# Patient Record
Sex: MALE | Race: WHITE | NOT HISPANIC OR LATINO | URBAN - METROPOLITAN AREA
[De-identification: names, ages, dates, MRNs, and addresses within clinical notes are randomized per-mention and may not be internally consistent; named-entity substitution may affect disease eponyms.]

---

## 2022-11-14 ENCOUNTER — TELEPHONE (OUTPATIENT)
Dept: PSYCHIATRY | Facility: CLINIC | Age: 62
End: 2022-11-14

## 2022-11-17 ENCOUNTER — TELEMEDICINE (OUTPATIENT)
Dept: PSYCHIATRY | Facility: CLINIC | Age: 62
End: 2022-11-17

## 2022-11-17 DIAGNOSIS — F41.1 GENERALIZED ANXIETY DISORDER: ICD-10-CM

## 2022-11-17 DIAGNOSIS — F31.9 BIPOLAR 1 DISORDER (HCC): Primary | ICD-10-CM

## 2022-11-17 RX ORDER — DIVALPROEX SODIUM 500 MG/1
500 TABLET, EXTENDED RELEASE ORAL
Qty: 180 TABLET | Refills: 0 | Status: SHIPPED | OUTPATIENT
Start: 2022-11-17

## 2022-11-17 RX ORDER — OLANZAPINE 5 MG/1
5 TABLET ORAL
COMMUNITY
Start: 2022-10-21 | End: 2022-11-17 | Stop reason: SDUPTHER

## 2022-11-17 RX ORDER — OLANZAPINE 15 MG/1
15 TABLET ORAL
Qty: 90 TABLET | Refills: 0 | Status: SHIPPED | OUTPATIENT
Start: 2022-11-17

## 2022-11-17 RX ORDER — LORAZEPAM 1 MG/1
1 TABLET ORAL 2 TIMES DAILY PRN
COMMUNITY
Start: 2022-08-30 | End: 2022-11-17 | Stop reason: SDUPTHER

## 2022-11-17 RX ORDER — OLANZAPINE 5 MG/1
5 TABLET ORAL
Qty: 90 TABLET | Refills: 0 | Status: SHIPPED | OUTPATIENT
Start: 2022-11-17

## 2022-11-17 RX ORDER — DIVALPROEX SODIUM 500 MG/1
TABLET, EXTENDED RELEASE ORAL
COMMUNITY
Start: 2022-11-15 | End: 2022-11-17 | Stop reason: SDUPTHER

## 2022-11-17 RX ORDER — OLANZAPINE 15 MG/1
15 TABLET ORAL
COMMUNITY
Start: 2022-10-08 | End: 2022-11-17 | Stop reason: SDUPTHER

## 2022-11-17 RX ORDER — LORAZEPAM 1 MG/1
1 TABLET ORAL 2 TIMES DAILY PRN
Qty: 60 TABLET | Refills: 1 | Status: SHIPPED | OUTPATIENT
Start: 2022-11-17

## 2022-11-17 NOTE — PSYCH
Virtual Regular Visit    Verification of patient location:    Patient is located in the following state in which I hold an active license [place]      Assessment/Plan:    Problem List Items Addressed This Visit    None  Visit Diagnoses     Bipolar 1 disorder ([facility] [address] )    -  Primary    Relevant Medications    OLANZapine (ZyPREXA) 5 mg tablet    OLANZapine (ZyPREXA) 15 mg tablet    LORazepam (ATIVAN) 1 mg tablet    divalproex sodium (DEPAKOTE ER) 500 mg 24 hr tablet    Other Relevant Orders    CBC and differential    Hepatic function panel    Valproic acid level, total    Basic metabolic panel    Generalized anxiety disorder        Relevant Medications    OLANZapine (ZyPREXA) 5 mg tablet    OLANZapine (ZyPREXA) 15 mg tablet    LORazepam (ATIVAN) 1 mg tablet          Goals addressed in session: Goal 1          Reason for visit is   Chief Complaint   Patient presents with   • Medication Management        Encounter provider Karlee Starks MD    Provider located at [address] [facility]  [address]  [phone]      Recent Visits  Date Type Provider Dept   11/14/22 Telephone 1140 [facility] recent visits within past 7 days and meeting all other requirements  Today's Visits  Date Type Provider Dept   11/17/22 [facility]icine [facility]  [address] [facility] and meeting all other requirements  Future Appointments  No visits were found meeting these conditions  Showing future appointments within next 150 days and meeting all other requirements       The patient was identified by name and date of birth  Aleksey Davis was informed that this is a telemedicine visit and that the visit is being conducted [employer]  He agrees to proceed     My office door was closed  No one else was in the room    He acknowledged consent and understanding of privacy and security of the video platform  The patient has agreed to participate and understands they can discontinue the visit at any time  Patient is aware this is a billable service  Subjective  Deric Loza is a 63 y o  male with bipolar do and anxiety presents for f/u    Collateral obtained from wife, who was present during the interview  Pt is compliant with meds; off ativan  SE - increased appetite and weight gain  Pt reports feeling good and stable for the last 3 months  I  received and reviewed blood work - done in 05/22 - depakote 72; LFTs, CBC - WNL; elevated cholesterol      HPI   Mood - back to baseline; denies depression or emerson; stable mood, functions in his daily routine  Anxiety - denies; off ativan for 2-3 months    Past Medical History:   Diagnosis Date   • History of hypertension    • Hypercholesterolemia        No past surgical history on file  Current Outpatient Medications   Medication Sig Dispense Refill   • divalproex sodium (DEPAKOTE ER) 500 mg 24 hr tablet Take 1 tablet (500 mg total) by mouth daily at bedtime 180 tablet 0   • LORazepam (ATIVAN) 1 mg tablet Take 1 tablet (1 mg total) by mouth 2 (two) times a day as needed for anxiety 60 tablet 1   • OLANZapine (ZyPREXA) 15 mg tablet Take 1 tablet (15 mg total) by mouth daily at bedtime 90 tablet 0   • OLANZapine (ZyPREXA) 5 mg tablet Take 1 tablet (5 mg total) by mouth daily at bedtime 90 tablet 0     No current facility-administered medications for this visit  No Known Allergies    Review of Systems   Constitutional: Positive for appetite change (increased)  Negative for activity change  Psychiatric/Behavioral: Negative for sleep disturbance and suicidal ideas  Video Exam    There were no vitals filed for this visit  Physical Exam  Constitutional:       Appearance: Normal appearance  Neurological:      Mental Status: He is alert     Psychiatric:         Attention and Perception: Attention and perception normal  Mood and Affect: Mood and affect normal          Speech: Speech normal          Behavior: Behavior normal  Behavior is cooperative  Thought Content:  Thought content normal          Judgment: Judgment normal           Visit Time    Visit Start Time: 6 17 pm   Visit Stop Time: 6 31 pm   Total Visit Duration: 20 minutes

## 2023-02-02 ENCOUNTER — TELEMEDICINE (OUTPATIENT)
Dept: PSYCHIATRY | Facility: CLINIC | Age: 63
End: 2023-02-02

## 2023-02-02 DIAGNOSIS — F41.1 GENERALIZED ANXIETY DISORDER: ICD-10-CM

## 2023-02-02 DIAGNOSIS — F31.9 BIPOLAR 1 DISORDER (HCC): Primary | ICD-10-CM

## 2023-02-02 RX ORDER — DIVALPROEX SODIUM 500 MG/1
500 TABLET, EXTENDED RELEASE ORAL
Qty: 180 TABLET | Refills: 0 | Status: SHIPPED | OUTPATIENT
Start: 2023-02-02

## 2023-02-02 RX ORDER — OLANZAPINE 5 MG/1
5 TABLET ORAL
Qty: 90 TABLET | Refills: 0 | Status: SHIPPED | OUTPATIENT
Start: 2023-02-02

## 2023-02-02 RX ORDER — OLANZAPINE 15 MG/1
15 TABLET ORAL
Qty: 90 TABLET | Refills: 0 | Status: SHIPPED | OUTPATIENT
Start: 2023-02-02

## 2023-02-02 NOTE — PSYCH
Virtual Regular Visit    Verification of patient location:    Patient is located in the following state in which I hold an active license [place]      Assessment/Plan:    Problem List Items Addressed This Visit    None  Visit Diagnoses     Bipolar 1 disorder ([facility] [address] )    -  Primary    Relevant Medications    OLANZapine (ZyPREXA) 5 mg tablet    OLANZapine (ZyPREXA) 15 mg tablet    divalproex sodium (DEPAKOTE ER) 500 mg 24 hr tablet    Other Relevant Orders    CBC and differential    Hepatic function panel    Valproic acid level, total    Generalized anxiety disorder        Relevant Medications    OLANZapine (ZyPREXA) 5 mg tablet    OLANZapine (ZyPREXA) 15 mg tablet          Goals addressed in session: Goal 1          Reason for visit is   Chief Complaint   Patient presents with   • Medication Management        Encounter provider Piter Maddox MD    Provider located at [address] [facility]  [address]  [phone]      Recent Visits  No visits were found meeting these conditions  Showing recent visits within past 7 days and meeting all other requirements  Today's Visits  Date Type Provider Dept   02/02/23 Telemedicine Piter Maddox, [address] [place] [address] today's visits and meeting all other requirements  Future Appointments  No visits were found meeting these conditions  Showing future appointments within next 150 days and meeting all other requirements       The patient was identified by name and date of birth  Jordynino Chhaya was informed that this is a telemedicine visit and that the visit is being conducted [employer]  He agrees to proceed     My office door was closed  No one else was in the room  He acknowledged consent and understanding of privacy and security of the video platform  The patient has agreed to participate and understands they can discontinue the visit at any time      Patient is aware this is a billable service  Subjective  Nadeen Ramsey is a 63 y o  male with bipolar do and anxiety presents for regular f/u    Compliant with meds, denies SE  Does `t use ativan   Foot pain - PT - improving  Check up with PCP next month  Weight stable  Collateral obtained form wife - she was present during the interview      HPI   Mood - reports as good and stable; denies blood work or depression  Good energy, functions at baseline  Anxiety - controlled --doesn`t need ativan    Past Medical History:   Diagnosis Date   • History of hypertension    • Hypercholesterolemia        History reviewed  No pertinent surgical history  Current Outpatient Medications   Medication Sig Dispense Refill   • divalproex sodium (DEPAKOTE ER) 500 mg 24 hr tablet Take 1 tablet (500 mg total) by mouth daily at bedtime 180 tablet 0   • OLANZapine (ZyPREXA) 15 mg tablet Take 1 tablet (15 mg total) by mouth daily at bedtime 90 tablet 0   • OLANZapine (ZyPREXA) 5 mg tablet Take 1 tablet (5 mg total) by mouth daily at bedtime 90 tablet 0   • LORazepam (ATIVAN) 1 mg tablet Take 1 tablet (1 mg total) by mouth 2 (two) times a day as needed for anxiety 60 tablet 1     No current facility-administered medications for this visit  No Known Allergies    Review of Systems   Constitutional: Negative for activity change and appetite change  Psychiatric/Behavioral: Negative for dysphoric mood, sleep disturbance and suicidal ideas  The patient is not nervous/anxious  Video Exam    There were no vitals filed for this visit  Physical Exam  Constitutional:       Appearance: Normal appearance  He is normal weight  Neurological:      Mental Status: He is alert  Psychiatric:         Attention and Perception: Attention and perception normal          Mood and Affect: Mood normal          Speech: Speech normal          Behavior: Behavior normal  Behavior is cooperative  Thought Content:  Thought content normal  Judgment: Judgment normal           Visit Time    Visit Start Time: 6 16 pm   Visit Stop Time: 6 25 pm   Total Visit Duration: 16 minutes

## 2023-05-11 ENCOUNTER — TELEMEDICINE (OUTPATIENT)
Dept: PSYCHIATRY | Facility: CLINIC | Age: 63
End: 2023-05-11

## 2023-05-11 DIAGNOSIS — F41.1 GENERALIZED ANXIETY DISORDER: ICD-10-CM

## 2023-05-11 DIAGNOSIS — F31.9 BIPOLAR 1 DISORDER (HCC): Primary | ICD-10-CM

## 2023-05-11 RX ORDER — OLANZAPINE 15 MG/1
15 TABLET ORAL
Qty: 90 TABLET | Refills: 0 | Status: SHIPPED | OUTPATIENT
Start: 2023-05-11

## 2023-05-11 RX ORDER — DIVALPROEX SODIUM 500 MG/1
500 TABLET, EXTENDED RELEASE ORAL
Qty: 180 TABLET | Refills: 0 | Status: SHIPPED | OUTPATIENT
Start: 2023-05-11

## 2023-05-11 RX ORDER — OLANZAPINE 5 MG/1
5 TABLET ORAL
Qty: 90 TABLET | Refills: 0 | Status: SHIPPED | OUTPATIENT
Start: 2023-05-11

## 2023-05-11 NOTE — PATIENT INSTRUCTIONS
Continue zyprexa and depakote  Will consider taper down zyprexa to 15 mg next appt  No blood work for review

## 2023-05-11 NOTE — PSYCH
Virtual Regular Visit    Verification of patient location:    Patient is located at Home in the following state in which I hold an active license [place]      Assessment/Plan:    Problem List Items Addressed This Visit    None      Goals addressed in session: Goal 1          Reason for visit is   Chief Complaint   Patient presents with   • Medication Management        Encounter provider Eduar Forman MD    Provider located at [address] [facility]  [address]  [phone]      Recent Visits  No visits were found meeting these conditions  Showing recent visits within past 7 days and meeting all other requirements  Today's Visits  Date Type Provider Dept   05/11/23 Telemedicine Eduar Forman, [facility] today's visits and meeting all other requirements  Future Appointments  No visits were found meeting these conditions  Showing future appointments within next 150 days and meeting all other requirements       The patient was identified by name and date of birth  Bobo Jaimes was informed that this is a telemedicine visit and that the visit is being conducted [employer]  He agrees to proceed     My office door was closed  No one else was in the room  He acknowledged consent and understanding of privacy and security of the video platform  The patient has agreed to participate and understands they can discontinue the visit at any time  Patient is aware this is a billable service  Subjective  Bobo Jaimes is a 63 y o  male with bipolar do presents for regular f/u      Compliant with meds , SE increased appetite at night; weight stable  R foot tendinitis  Collateral obtained from wife, who was present during the interview  Invited to the wedding next month      HPI   Mood - reports as good and stable; denies depression; functions at baseline  Anxiety - controlled; denies panic attacks or racing thoughts     Past Medical History:   Diagnosis Date   • History of hypertension    • Hypercholesterolemia        History reviewed  No pertinent surgical history  Current Outpatient Medications   Medication Sig Dispense Refill   • divalproex sodium (DEPAKOTE ER) 500 mg 24 hr tablet Take 1 tablet (500 mg total) by mouth daily at bedtime 180 tablet 0   • LORazepam (ATIVAN) 1 mg tablet Take 1 tablet (1 mg total) by mouth 2 (two) times a day as needed for anxiety 60 tablet 1   • OLANZapine (ZyPREXA) 15 mg tablet Take 1 tablet (15 mg total) by mouth daily at bedtime 90 tablet 0   • OLANZapine (ZyPREXA) 5 mg tablet Take 1 tablet (5 mg total) by mouth daily at bedtime 90 tablet 0     No current facility-administered medications for this visit  No Known Allergies    Review of Systems   Constitutional: Negative for activity change and appetite change  Psychiatric/Behavioral: Negative for dysphoric mood, sleep disturbance and suicidal ideas  The patient is not nervous/anxious  Video Exam    There were no vitals filed for this visit  Physical Exam  Constitutional:       Appearance: Normal appearance  He is normal weight  Neurological:      Mental Status: He is alert  Psychiatric:         Attention and Perception: Attention and perception normal          Mood and Affect: Mood and affect normal          Speech: Speech normal          Behavior: Behavior normal  Behavior is cooperative  Thought Content:  Thought content normal          Judgment: Judgment normal           Visit Time    Visit Start Time: 6 19 pm   Visit Stop Time: 6 27 pm   Total Visit Duration: 17 minutes

## 2023-08-17 ENCOUNTER — TELEMEDICINE (OUTPATIENT)
Dept: PSYCHIATRY | Facility: CLINIC | Age: 63
End: 2023-08-17
Payer: MEDICARE

## 2023-08-17 DIAGNOSIS — F41.1 GENERALIZED ANXIETY DISORDER: ICD-10-CM

## 2023-08-17 DIAGNOSIS — F31.9 BIPOLAR 1 DISORDER (HCC): Primary | ICD-10-CM

## 2023-08-17 PROCEDURE — 99214 OFFICE O/P EST MOD 30 MIN: CPT | Performed by: PSYCHIATRY & NEUROLOGY

## 2023-08-17 RX ORDER — DIVALPROEX SODIUM 500 MG/1
500 TABLET, EXTENDED RELEASE ORAL
Qty: 90 TABLET | Refills: 0 | Status: SHIPPED | OUTPATIENT
Start: 2023-08-17

## 2023-08-17 RX ORDER — OLANZAPINE 15 MG/1
15 TABLET ORAL
Qty: 90 TABLET | Refills: 0 | Status: SHIPPED | OUTPATIENT
Start: 2023-08-17

## 2023-08-17 RX ORDER — OLANZAPINE 2.5 MG/1
2.5 TABLET ORAL
Qty: 90 TABLET | Refills: 0 | Status: SHIPPED | OUTPATIENT
Start: 2023-08-17

## 2023-08-17 NOTE — PSYCH
Virtual Regular Visit    Verification of patient location:    Patient is located at Home in the following state in which I hold an active license [place]      Assessment/Plan:    Problem List Items Addressed This Visit    None      Goals addressed in session: Goal 1          Reason for visit is   Chief Complaint   Patient presents with   • Medication Management        Encounter provider Moses Orona MD    Provider located at [address] [place] [address],[address]  [address]  [phone]      Recent Visits  No visits were found meeting these conditions. Showing recent visits within past 7 days and meeting all other requirements  Today's Visits  Date Type Provider Dept   08/17/23 Telemedicine Moses Orona, [address] today's visits and meeting all other requirements  Future Appointments  No visits were found meeting these conditions. Showing future appointments within next 150 days and meeting all other requirements       The patient was identified by name and date of birth. Ludwig Jacob was informed that this is a telemedicine visit and that the visit is being conducted [employer]. He agrees to proceed. .  My office door was closed. No one else was in the room. He acknowledged consent and understanding of privacy and security of the video platform. The patient has agreed to participate and understands they can discontinue the visit at any time. Patient is aware this is a billable service.      Subjective  Ludwig Jacob is a 63 y.o. male with bipolar do presents for regular f/u  .  compliant with meds, SE increased appetite  Collateral obtained from his wife, who was present during the interview  Foot pain - improving  Pt helps his mother daily      HPI   Mood - reports as good and stable; active, social; denies depression or emerson  Functions at baseline  Anxiety - controlled; denies panic attacks or racing thoughts; does`nt take ativan    Past Medical History:   Diagnosis Date   • History of hypertension    • Hypercholesterolemia        History reviewed. No pertinent surgical history. Current Outpatient Medications   Medication Sig Dispense Refill   • divalproex sodium (DEPAKOTE ER) 500 mg 24 hr tablet Take 1 tablet (500 mg total) by mouth daily at bedtime 180 tablet 0   • LORazepam (ATIVAN) 1 mg tablet Take 1 tablet (1 mg total) by mouth 2 (two) times a day as needed for anxiety 60 tablet 1   • OLANZapine (ZyPREXA) 15 mg tablet Take 1 tablet (15 mg total) by mouth daily at bedtime 90 tablet 0   • OLANZapine (ZyPREXA) 5 mg tablet Take 1 tablet (5 mg total) by mouth daily at bedtime 90 tablet 0     No current facility-administered medications for this visit. No Known Allergies    Review of Systems   Constitutional: Negative for activity change and appetite change. Psychiatric/Behavioral: Negative for dysphoric mood, sleep disturbance and suicidal ideas. The patient is not nervous/anxious. Video Exam    There were no vitals filed for this visit. Physical Exam  Constitutional:       Appearance: Normal appearance. He is normal weight. Neurological:      Mental Status: He is alert. Psychiatric:         Attention and Perception: Attention and perception normal.         Mood and Affect: Mood and affect normal.         Speech: Speech normal.         Behavior: Behavior normal. Behavior is cooperative. Thought Content:  Thought content normal.         Judgment: Judgment normal.          Visit Time    Visit Start Time: 6.19 pm   Visit Stop Time: 6.28 pm   Total Visit Duration: 17 minutes

## 2023-11-16 ENCOUNTER — TELEMEDICINE (OUTPATIENT)
Dept: PSYCHIATRY | Facility: CLINIC | Age: 63
End: 2023-11-16
Payer: MEDICARE

## 2023-11-16 DIAGNOSIS — F41.1 GENERALIZED ANXIETY DISORDER: Primary | ICD-10-CM

## 2023-11-16 DIAGNOSIS — F31.9 BIPOLAR 1 DISORDER (HCC): ICD-10-CM

## 2023-11-16 PROCEDURE — 99214 OFFICE O/P EST MOD 30 MIN: CPT | Performed by: PSYCHIATRY & NEUROLOGY

## 2023-11-16 RX ORDER — OLANZAPINE 15 MG/1
15 TABLET ORAL
Qty: 90 TABLET | Refills: 0 | Status: SHIPPED | OUTPATIENT
Start: 2023-11-16

## 2023-11-16 RX ORDER — DIVALPROEX SODIUM 500 MG/1
500 TABLET, EXTENDED RELEASE ORAL
Qty: 90 TABLET | Refills: 0 | Status: SHIPPED | OUTPATIENT
Start: 2023-11-16

## 2023-11-16 RX ORDER — OLANZAPINE 2.5 MG/1
2.5 TABLET ORAL
Qty: 90 TABLET | Refills: 0 | Status: SHIPPED | OUTPATIENT
Start: 2023-11-16

## 2023-11-16 NOTE — PSYCH
Virtual Regular Visit    Verification of patient location:    Patient is located at Home in the following state in which I hold an [place] license [place]      Assessment/Plan:    Problem List Items Addressed This Visit    None      Goals addressed in session: Goal 1          Reason for visit is   Chief Complaint   Patient presents with    Medication Management        Encounter provider Mani Hall MD    Provider located at [address] [place] [address],[address]  [address]  [phone]      Recent Visits  No visits were found meeting these conditions. Showing recent visits within past 7 days and meeting all other requirements  Today's Visits  Date Type Provider Dept   11/16/23 Telemedicine Mani Hall, [address] today's visits and meeting all other requirements  Future Appointments  No visits were found meeting these conditions. Showing future appointments within next 150 days and meeting all other requirements       The patient was identified by name and date of birth. Palomo Cruz was informed that this is a telemedicine visit and that the visit is being conducted [employer]. He agrees to proceed. .  My office door was closed. No one else was in the room. He acknowledged consent and understanding of privacy and security of the video platform. The patient has agreed to participate and understands they can discontinue the visit at any time. Patient is aware this is a billable service. Subjective  Palomo Cruz is a 63 y.o. male with bipolar do and anxiety presents for regular f/u  .   Continued zyprexa 17.5 mg   Collateral obtained from wife, who was present during the interview  As per pt, recent blood work with PCP - WNL  Foot pain - improving  Helps his mother; does yard work      HPI   Mood - reports as mostly good and stable; one episode of feeling " hyper" , mild  Does ADLs, active, social  Anxiety - controlled; denies racing thoughts or panic attacks    Past Medical History:   Diagnosis Date    History of hypertension     Hypercholesterolemia        History reviewed. No pertinent surgical history. Current Outpatient Medications   Medication Sig Dispense Refill    divalproex sodium (DEPAKOTE ER) 500 mg 24 hr tablet Take 1 tablet (500 mg total) by mouth daily at bedtime 90 tablet 0    LORazepam (ATIVAN) 1 mg tablet Take 1 tablet (1 mg total) by mouth 2 (two) times a day as needed for anxiety 60 tablet 1    OLANZapine (ZyPREXA) 15 mg tablet Take 1 tablet (15 mg total) by mouth daily at bedtime 90 tablet 0    OLANZapine (ZyPREXA) 2.5 mg tablet Take 1 tablet (2.5 mg total) by mouth daily at bedtime With 15 mg 90 tablet 0     No current facility-administered medications for this visit. No Known Allergies    Review of Systems   Constitutional:  Negative for activity change and appetite change. Psychiatric/Behavioral:  Negative for dysphoric mood, sleep disturbance and suicidal ideas. The patient is not nervous/anxious. Video Exam    There were no vitals filed for this visit. Physical Exam  Constitutional:       Appearance: Normal appearance. He is normal weight. Neurological:      Mental Status: He is alert. Psychiatric:         Attention and Perception: Attention and perception normal.         Mood and Affect: Mood and affect normal.         Speech: Speech normal.         Behavior: Behavior normal. Behavior is cooperative. Thought Content:  Thought content normal.         Judgment: Judgment normal.          Visit Time    Visit Start Time: 6.20 pm   Visit Stop Time: 6.26 pm   Total Visit Duration:  15 minutes    TREATMENT PLAN (Medication Management Only)        [facility]    Name and Date of Birth:  Bam Acevedo 63 y.o. 1960  Date of Treatment Plan: November 16, 2023  Diagnosis/Diagnoses:  No diagnosis found. Strengths/Personal Resources for Self-Care: supportive family, taking medications as prescribed. Area/Areas of need (in own words): mood instability  1. Long Term Goal: maintain control of manic symptoms. Target Date:6 months - 5/16/2024  Person/Persons responsible for completion of goal: Lee Ann Fine  2. Short Term Objective (s) - How will we reach this goal?:   A. Provider new recommended medication/dosage changes and/or continue medication(s): continue current medications as prescribed Depakote ER, Zyprexa. B. N/A.  C. N/A. Target Date:6 months - 5/16/2024  Person/Persons Responsible for Completion of Goal: Lee Ann Fine  Progress Towards Goals: improving  Treatment Modality: medication management every 3 months  Review due 180 days from date of this plan: 6 months - 5/16/2024  Expected length of service: ongoing treatment  My Physician/PA/NP and I have developed this plan together and I agree to work on the goals and objectives. I understand the treatment goals that were developed for my treatment.

## 2024-02-09 DIAGNOSIS — F31.9 BIPOLAR 1 DISORDER (HCC): ICD-10-CM

## 2024-02-09 NOTE — TELEPHONE ENCOUNTER
Pt requested refills on the olanzapine 15mg and 2.5mg, along with the depakote 500mg be sent to the [facility] on file    Will run out before appt next month

## 2024-02-12 ENCOUNTER — TELEPHONE (OUTPATIENT)
Dept: PSYCHIATRY | Facility: CLINIC | Age: 64
End: 2024-02-12

## 2024-02-12 DIAGNOSIS — F31.9 BIPOLAR 1 DISORDER (HCC): ICD-10-CM

## 2024-02-12 RX ORDER — OLANZAPINE 15 MG/1
15 TABLET ORAL
Qty: 90 TABLET | Refills: 0 | Status: SHIPPED | OUTPATIENT
Start: 2024-02-12

## 2024-02-12 RX ORDER — OLANZAPINE 2.5 MG/1
2.5 TABLET, FILM COATED ORAL
Qty: 90 TABLET | Refills: 0 | Status: SHIPPED | OUTPATIENT
Start: 2024-02-12

## 2024-02-12 RX ORDER — DIVALPROEX SODIUM 500 MG/1
TABLET, EXTENDED RELEASE ORAL
Qty: 180 TABLET | Refills: 0 | Status: SHIPPED | OUTPATIENT
Start: 2024-02-12

## 2024-02-12 RX ORDER — DIVALPROEX SODIUM 500 MG/1
500 TABLET, EXTENDED RELEASE ORAL
Qty: 90 TABLET | Refills: 0 | Status: SHIPPED | OUTPATIENT
Start: 2024-02-12 | End: 2024-02-12 | Stop reason: SDUPTHER

## 2024-02-12 NOTE — TELEPHONE ENCOUNTER
Pt called, said that he's always taken 2 500mg tabs daily of depakote but recently it's being called in as 1 daily so he's having to double up and running out too soon    He's asking for a correct script to be sent to the pharmacy but your notes do not say if it's supposed to be 1 or 2 daily

## 2024-02-12 NOTE — TELEPHONE ENCOUNTER
Pt's wife called back, said that for years he's always taken 2 so that he totals 1000mgs daily    She said that she does not ever remember a change being made to this medication but that his last bottle says 1 daily but neither of them noticed what the bottle said, he just took 2 daily like he was used to doing     She's asking how many a day should he be taking

## 2024-02-12 NOTE — TELEPHONE ENCOUNTER
"Spoke with pt, gave message. He said \"this is a shock to him\" as he's always taken 2  Said that his wife is not home but he'll ask her to call us back   "

## 2024-03-05 ENCOUNTER — TELEMEDICINE (OUTPATIENT)
Dept: PSYCHIATRY | Facility: CLINIC | Age: 64
End: 2024-03-05
Payer: MEDICARE

## 2024-03-05 DIAGNOSIS — F31.9 BIPOLAR 1 DISORDER (HCC): ICD-10-CM

## 2024-03-05 DIAGNOSIS — F41.1 GENERALIZED ANXIETY DISORDER: ICD-10-CM

## 2024-03-05 PROCEDURE — 99214 OFFICE O/P EST MOD 30 MIN: CPT | Performed by: PSYCHIATRY & NEUROLOGY

## 2024-03-05 RX ORDER — OLANZAPINE 2.5 MG/1
2.5 TABLET, FILM COATED ORAL
Qty: 90 TABLET | Refills: 0 | Status: SHIPPED | OUTPATIENT
Start: 2024-03-05

## 2024-03-05 RX ORDER — OLANZAPINE 15 MG/1
15 TABLET ORAL
Qty: 90 TABLET | Refills: 0 | Status: SHIPPED | OUTPATIENT
Start: 2024-03-05

## 2024-03-05 RX ORDER — DIVALPROEX SODIUM 500 MG/1
TABLET, EXTENDED RELEASE ORAL
Qty: 180 TABLET | Refills: 0 | Status: SHIPPED | OUTPATIENT
Start: 2024-03-05

## 2024-03-05 NOTE — PSYCH
Virtual Regular Visit    Verification of patient location:    Patient is located at Home in the following state in which I hold an active license [place]      Assessment/Plan:    Problem List Items Addressed This Visit    None      Goals addressed in session: Goal 1          Reason for visit is   Chief Complaint   Patient presents with    Medication Management        Encounter provider Nini Turpin MD    Provider located at [facility]  [address] [facility] [id number] [phone]      Recent Visits  No visits were found meeting these conditions.  Showing recent visits within past 7 days and meeting all other requirements  Today's Visits  Date Type Provider Dept   03/05/24 Telemedicine Nini Turpin MD [facility]   Showing today's visits and meeting all other requirements  Future Appointments  No visits were found meeting these conditions.  Showing future appointments within next 150 days and meeting all other requirements       The patient was identified by name and date of birth. Alonso Shah was informed that this is a telemedicine visit and that the visit is being conducted throughthe Epic Embedded platform. He agrees to proceed..  My office door was closed. No one else was in the room.  He acknowledged consent and understanding of privacy and security of the video platform. The patient has agreed to participate and understands they can discontinue the visit at any time.    Patient is aware this is a billable service.     Subjective  Alonso Shah is a 63 y.o. male with bipolar do and anxiety presents for regular f/u  .  On zyprexa 17.5 mg hs, depakote 1000 mg  - denies SE  Collateral obtained from wife, who was present during the interview  Foot pain - improved  Pt stays with his 81 y.o mother most of the week; spending daytime on weekends with wife    HPI   Mood - reports as mostly good and stable; does ADLs, active,  social , denies depression or manic spx  Anxiety - controlled; denies panic attacks or racing thoughts  Past Medical History:   Diagnosis Date    History of hypertension     Hypercholesterolemia        History reviewed. No pertinent surgical history.    Current Outpatient Medications   Medication Sig Dispense Refill    divalproex sodium (DEPAKOTE ER) 500 mg 24 hr tablet 2 po daily 180 tablet 0    LORazepam (ATIVAN) 1 mg tablet Take 1 tablet (1 mg total) by mouth 2 (two) times a day as needed for anxiety 60 tablet 1    OLANZapine (ZyPREXA) 15 mg tablet Take 1 tablet (15 mg total) by mouth daily at bedtime 90 tablet 0    OLANZapine (ZyPREXA) 2.5 mg tablet Take 1 tablet (2.5 mg total) by mouth daily at bedtime With 15 mg 90 tablet 0     No current facility-administered medications for this visit.        No Known Allergies    Review of Systems   Constitutional:  Negative for activity change and appetite change.   Psychiatric/Behavioral:  Negative for dysphoric mood, sleep disturbance and suicidal ideas. The patient is not nervous/anxious.        Video Exam    There were no vitals filed for this visit.    Physical Exam  Constitutional:       Appearance: Normal appearance. He is normal weight.   Neurological:      Mental Status: He is alert.   Psychiatric:         Attention and Perception: Attention and perception normal.         Mood and Affect: Mood and affect normal.         Speech: Speech normal.         Behavior: Behavior normal. Behavior is cooperative.         Thought Content: Thought content normal.         Judgment: Judgment normal.          Visit Time    Visit Start Time: 3.57 pm   Visit Stop Time: 4.08 pm   Total Visit Duration:  22 minutes

## 2024-05-30 ENCOUNTER — TELEMEDICINE (OUTPATIENT)
Dept: PSYCHIATRY | Facility: CLINIC | Age: 64
End: 2024-05-30
Payer: MEDICARE

## 2024-05-30 DIAGNOSIS — F41.1 GENERALIZED ANXIETY DISORDER: ICD-10-CM

## 2024-05-30 DIAGNOSIS — F31.9 BIPOLAR 1 DISORDER (HCC): Primary | ICD-10-CM

## 2024-05-30 PROCEDURE — 99214 OFFICE O/P EST MOD 30 MIN: CPT | Performed by: PSYCHIATRY & NEUROLOGY

## 2024-05-30 RX ORDER — ROSUVASTATIN CALCIUM 5 MG/1
5 TABLET, COATED ORAL EVERY OTHER DAY
COMMUNITY
Start: 2024-05-08

## 2024-05-30 RX ORDER — EZETIMIBE 10 MG/1
10 TABLET ORAL DAILY
COMMUNITY
Start: 2024-03-19

## 2024-05-30 RX ORDER — DIVALPROEX SODIUM 500 MG/1
TABLET, EXTENDED RELEASE ORAL
Qty: 180 TABLET | Refills: 0 | Status: SHIPPED | OUTPATIENT
Start: 2024-05-30

## 2024-05-30 RX ORDER — OLANZAPINE 15 MG/1
15 TABLET ORAL
Qty: 90 TABLET | Refills: 0 | Status: SHIPPED | OUTPATIENT
Start: 2024-05-30

## 2024-05-30 NOTE — PSYCH
Virtual Regular Visit    Verification of patient location:    Patient is located at Home in the following state in which I hold an active license [place]      Assessment/Plan:    Problem List Items Addressed This Visit    None      Goals addressed in session: Goal 1          Reason for visit is   Chief Complaint   Patient presents with    Medication Management        Encounter provider Nini Turpin MD      Recent Visits  No visits were found meeting these conditions.  Showing recent visits within past 7 days and meeting all other requirements  Today's Visits  Date Type Provider Dept   05/30/24 Telemedicine Nini Turpin MD [facility]   Showing today's visits and meeting all other requirements  Future Appointments  No visits were found meeting these conditions.  Showing future appointments within next 150 days and meeting all other requirements       The patient was identified by name and date of birth. Alonso Shah was informed that this is a telemedicine visit and that the visit is being conducted throughthe Epic Embedded platform. He agrees to proceed..  My office door was closed. No one else was in the room.  He acknowledged consent and understanding of privacy and security of the video platform. The patient has agreed to participate and understands they can discontinue the visit at any time.    Patient is aware this is a billable service.     Subjective  Alonso Shah is a 63 y.o. male with bipolar do and anxiety presents for regular f/u  .  On zyprexa 15 mg ; depakote 1000 mg hs; not using ativan   SE - increased appetite; some weight gain  Blood work by PCP; HC on meds; due for check up soon  MALIKA, not using CPAP - episode of SOB last night  Pt spends most days at mother`s place  Collateral obtained from wife, who was present during the interview   HPI   Mood - reports as mostly good and stable; denies emerson or depression; good energy and motivation; active and social   Anxiety - worries  sometimes; mild  Denies panic attacks  Past Medical History:   Diagnosis Date    History of hypertension     Hypercholesterolemia        History reviewed. No pertinent surgical history.    Current Outpatient Medications   Medication Sig Dispense Refill    divalproex sodium (DEPAKOTE ER) 500 mg 24 hr tablet 2 po daily 180 tablet 0    ezetimibe (ZETIA) 10 mg tablet Take 10 mg by mouth daily      LORazepam (ATIVAN) 1 mg tablet Take 1 tablet (1 mg total) by mouth 2 (two) times a day as needed for anxiety 60 tablet 1    OLANZapine (ZyPREXA) 15 mg tablet Take 1 tablet (15 mg total) by mouth daily at bedtime 90 tablet 0    rosuvastatin (CRESTOR) 5 mg tablet Take 5 mg by mouth every other day      OLANZapine (ZyPREXA) 2.5 mg tablet Take 1 tablet (2.5 mg total) by mouth daily at bedtime With 15 mg 90 tablet 0     No current facility-administered medications for this visit.        No Known Allergies    Review of Systems   Constitutional:  Negative for activity change and appetite change.   Psychiatric/Behavioral:  Negative for dysphoric mood, sleep disturbance and suicidal ideas. The patient is not nervous/anxious.        Video Exam    There were no vitals filed for this visit.    Physical Exam  Constitutional:       Appearance: Normal appearance. He is normal weight.   Neurological:      Mental Status: He is alert.   Psychiatric:         Attention and Perception: Attention and perception normal.         Mood and Affect: Mood and affect normal.         Speech: Speech normal.         Behavior: Behavior normal. Behavior is cooperative.         Thought Content: Thought content normal.         Judgment: Judgment normal.          Visit Time    Visit Start Time: 7.00 pm   Visit Stop Time: 7.11 pm   Total Visit Duration:  25 minutes    TREATMENT PLAN (Medication Management Only)        [facility]    Name and Date of Birth:  Alonso Felipelisadeidra 63 y.o. 1960  Date of Treatment  Plan: May 30, 2024  Diagnosis/Diagnoses:  No diagnosis found.  Strengths/Personal Resources for Self-Care: supportive family, taking medications as prescribed.  Area/Areas of need (in own words): anxiety, mood swings  1. Long Term Goal: maintain control of manic symptoms.  Target Date:6 months - 11/30/2024  Person/Persons responsible for completion of goal: Alonso  2.  Short Term Objective (s) - How will we reach this goal?:   A. Provider new recommended medication/dosage changes and/or continue medication(s): continue current medications as prescribed Depakote ER, Zyprexa.  B. N/A.  C. N/A.  Target Date:6 months - 11/30/2024  Person/Persons Responsible for Completion of Goal: Alonso  Progress Towards Goals: stable  Treatment Modality: medication management every 3 months  Review due 180 days from date of this plan: 6 months - 11/30/2024  Expected length of service: ongoing treatment  My Physician/PA/NP and I have developed this plan together and I agree to work on the goals and objectives. I understand the treatment goals that were developed for my treatment.

## 2024-08-22 ENCOUNTER — TELEMEDICINE (OUTPATIENT)
Dept: PSYCHIATRY | Facility: CLINIC | Age: 64
End: 2024-08-22
Payer: MEDICARE

## 2024-08-22 DIAGNOSIS — F31.9 BIPOLAR 1 DISORDER (HCC): Primary | ICD-10-CM

## 2024-08-22 DIAGNOSIS — F41.1 GENERALIZED ANXIETY DISORDER: ICD-10-CM

## 2024-08-22 PROCEDURE — 99214 OFFICE O/P EST MOD 30 MIN: CPT | Performed by: PSYCHIATRY & NEUROLOGY

## 2024-08-22 RX ORDER — DIVALPROEX SODIUM 500 MG/1
TABLET, EXTENDED RELEASE ORAL
Qty: 180 TABLET | Refills: 0 | Status: SHIPPED | OUTPATIENT
Start: 2024-08-22

## 2024-08-22 RX ORDER — OLANZAPINE 15 MG/1
15 TABLET ORAL
Qty: 90 TABLET | Refills: 0 | Status: SHIPPED | OUTPATIENT
Start: 2024-08-22

## 2024-08-22 NOTE — PSYCH
"  Virtual Regular Visit    Verification of patient location:    Patient is located at Home in the following state in which I hold an active license [place]      Assessment/Plan:    Problem List Items Addressed This Visit    None      Goals addressed in session: Goal 1          Reason for visit is   Chief Complaint   Patient presents with    Medication Management        Encounter provider Nini Turpin MD      Recent Visits  No visits were found meeting these conditions.  Showing recent visits within past 7 days and meeting all other requirements  Today's Visits  Date Type Provider Dept   08/22/24 Telemedicine Nini Turpin MD [facility]   Showing today's visits and meeting all other requirements  Future Appointments  No visits were found meeting these conditions.  Showing future appointments within next 150 days and meeting all other requirements       The patient was identified by name and date of birth. Alonso Shah was informed that this is a telemedicine visit and that the visit is being conducted throughthe Epic Embedded platform. He agrees to proceed..  My office door was closed. No one else was in the room.  He acknowledged consent and understanding of privacy and security of the video platform. The patient has agreed to participate and understands they can discontinue the visit at any time.    Patient is aware this is a billable service.     Subjective  Alonso Shah is a 63 y.o. adult with bipolar do presents for regular f/u  .  Compliant with meds, denies SE  Lost 5 lbs on a diet  Takes care of his mother  Denies acute medical problems    HPI   Mood - sometimes gets \" down\"; but denies depression or manic spx. Active, social ; does ADLs  Denies racing thoughts or panic atttacks  Sleep - good  Past Medical History:   Diagnosis Date    History of hypertension     Hypercholesterolemia        History reviewed. No pertinent surgical history.    Current Outpatient Medications   Medication " Sig Dispense Refill    divalproex sodium (DEPAKOTE ER) 500 mg 24 hr tablet 2 po daily 180 tablet 0    ezetimibe (ZETIA) 10 mg tablet Take 10 mg by mouth daily      LORazepam (ATIVAN) 1 mg tablet Take 1 tablet (1 mg total) by mouth 2 (two) times a day as needed for anxiety 60 tablet 1    OLANZapine (ZyPREXA) 15 mg tablet Take 1 tablet (15 mg total) by mouth daily at bedtime 90 tablet 0    rosuvastatin (CRESTOR) 5 mg tablet Take 5 mg by mouth every other day       No current facility-administered medications for this visit.        No Known Allergies    Review of Systems   Constitutional:  Negative for activity change and appetite change.   Psychiatric/Behavioral:  Negative for dysphoric mood, sleep disturbance and suicidal ideas. The patient is not nervous/anxious.        Video Exam    There were no vitals filed for this visit.    Physical Exam  Constitutional:       Appearance: Normal appearance. He is normal weight.   Neurological:      Mental Status: He is alert.   Psychiatric:         Attention and Perception: Attention and perception normal.         Mood and Affect: Mood and affect normal.         Speech: Speech normal.         Behavior: Behavior normal. Behavior is cooperative.         Thought Content: Thought content normal.         Judgment: Judgment normal.          Visit Time    Visit Start Time: 6.57 pm   Visit Stop Time: 7.07 pm   Total Visit Duration:  20 minutes

## 2024-11-12 ENCOUNTER — TELEPHONE (OUTPATIENT)
Dept: PSYCHIATRY | Facility: CLINIC | Age: 64
End: 2024-11-12

## 2024-11-12 NOTE — TELEPHONE ENCOUNTER
Writer called client to let him know that his yearly forms will  day of his appointment and to get the [facility] New Patient Consents. Client's wife answered and he is unavailable at the moment.    Writer could not discuss the purpose of call due to no GARRETT, client's wife understands.     Writer went over ways in which we could obtain a release of information in order to speak with her. She says her  coming in would be the easiest and he should be able to do so prior to his appointment 24 (she brought up appointment day, not writer).    Writer did inform client's wife that he did not necessarily need to sign the GARRETT prior to Thursday's appointment. Client's wife said it would be best to get it done right away so he does not forget.     *addendum - writer did tell client's wife that he is usually here until 5 but is here later on Thursday.

## 2024-11-12 NOTE — TELEPHONE ENCOUNTER
Writer called client's wife back to clarify that if for some reason he does come here at a time writer isn't available (such as Thursday morning), that anyone should be able to help him. It would just be easier as writer is already aware of what's needed for him to come to [facility].     Client's wife notified writer that she talked to him and he will be coming in today, 11/12/24.

## 2024-11-14 ENCOUNTER — TELEMEDICINE (OUTPATIENT)
Dept: PSYCHIATRY | Facility: CLINIC | Age: 64
End: 2024-11-14
Payer: MEDICARE

## 2024-11-14 DIAGNOSIS — F31.9 BIPOLAR 1 DISORDER (HCC): Primary | ICD-10-CM

## 2024-11-14 DIAGNOSIS — F41.1 GENERALIZED ANXIETY DISORDER: ICD-10-CM

## 2024-11-14 PROCEDURE — 99214 OFFICE O/P EST MOD 30 MIN: CPT | Performed by: PSYCHIATRY & NEUROLOGY

## 2024-11-14 RX ORDER — DIVALPROEX SODIUM 500 MG/1
TABLET, FILM COATED, EXTENDED RELEASE ORAL
Qty: 180 TABLET | Refills: 1 | Status: SHIPPED | OUTPATIENT
Start: 2024-11-14

## 2024-11-14 RX ORDER — OLANZAPINE 15 MG/1
15 TABLET ORAL
Qty: 90 TABLET | Refills: 1 | Status: SHIPPED | OUTPATIENT
Start: 2024-11-14

## 2024-11-14 RX ORDER — LORAZEPAM 1 MG/1
1 TABLET ORAL 2 TIMES DAILY PRN
Qty: 30 TABLET | Refills: 0 | Status: SHIPPED | OUTPATIENT
Start: 2024-11-14

## 2024-11-15 ENCOUNTER — TELEPHONE (OUTPATIENT)
Dept: PSYCHIATRY | Facility: CLINIC | Age: 64
End: 2024-11-15

## 2024-11-15 NOTE — TELEPHONE ENCOUNTER
Writer called client's spouse (has GARRETT) and scheduled 6 month follow up for 5/15/24 at 7 pm with Dr. Turpin.

## 2024-11-15 NOTE — BH TREATMENT PLAN
TREATMENT PLAN (Medication Management Only)        [facility]    Name and Date of Birth:  Alonso Shah 63 y.o. 1960  Date of Treatment Plan: November 14, 2024  Diagnosis/Diagnoses:    1. Bipolar 1 disorder (HCC)    2. Generalized anxiety disorder      Strengths/Personal Resources for Self-Care: supportive family, taking medications as prescribed.  Area/Areas of need (in own words): anxiety, mood instability  1. Long Term Goal: maintain mood stability.  Target Date:6 months - 5/14/2025  Person/Persons responsible for completion of goal: Alonso  2.  Short Term Objective (s) - How will we reach this goal?:   A. Provider new recommended medication/dosage changes and/or continue medication(s): continue current medications as prescribed Depakote ER, Zyprexa, Ativan.  B. N/A.  C. N/A.  Target Date:6 months - 5/14/2025  Person/Persons Responsible for Completion of Goal: Alonso  Progress Towards Goals: stable  Treatment Modality: medication management every 6 months  Review due 180 days from date of this plan: 6 months - 5/14/2025  Expected length of service: ongoing treatment  My Physician/PA/NP and I have developed this plan together and I agree to work on the goals and objectives. I understand the treatment goals that were developed for my treatment.

## 2024-11-15 NOTE — PSYCH
Virtual Regular Visit    Verification of patient location:    Patient is located at Home in the following state in which I hold an active license [place]      Assessment/Plan:  Assessment & Plan  Bipolar 1 disorder (HCC)    Orders:    divalproex sodium (DEPAKOTE ER) 500 mg 24 hr tablet; 2 po daily    OLANZapine (ZyPREXA) 15 mg tablet; Take 1 tablet (15 mg total) by mouth daily at bedtime    Generalized anxiety disorder    Orders:    LORazepam (ATIVAN) 1 mg tablet; Take 1 tablet (1 mg total) by mouth 2 (two) times a day as needed for anxiety       Problem List Items Addressed This Visit    None  Visit Diagnoses         Bipolar 1 disorder (HCC)    -  Primary      Generalized anxiety disorder                Goals addressed in session: Goal 1          Reason for visit is   Chief Complaint   Patient presents with    Medication Management        Encounter provider Nini Turpin MD      Recent Visits  Date Type Provider Dept   11/12/24 [facility]   Showing recent visits within past 7 days and meeting all other requirements  Today's Visits  Date Type Provider Dept   11/14/24 [facility]   Showing today's visits and meeting all other requirements  Future Appointments  No visits were found meeting these conditions.  Showing future appointments within next 150 days and meeting all other requirements       The patient was identified by name and date of birth. Alonso Shah was informed that this is a telemedicine visit and that the visit is being conducted throughthe Epic Embedded platform. He agrees to proceed..  My office door was closed. No one else was in the room.  He acknowledged consent and understanding of privacy and security of the video platform. The patient has agreed to participate and understands they can discontinue the visit at any time.    Patient is aware this is a billable service.     Subjective  Alonso Shah is a  63 y.o. adult with bipolar do and anxiety presents for regular f/u  .  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 No pertinent surgical history.    Current Outpatient Medications   Medication Sig Dispense Refill    ezetimibe (ZETIA) 10 mg tablet Take 10 mg by mouth daily      rosuvastatin (CRESTOR) 5 mg tablet Take 5 mg by mouth every other day      divalproex sodium (DEPAKOTE ER) 500 mg 24 hr tablet 2 po daily 180 tablet 0    LORazepam (ATIVAN) 1 mg tablet Take 1 tablet (1 mg total) by mouth 2 (two) times a day as needed for anxiety 60 tablet 1    OLANZapine (ZyPREXA) 15 mg tablet Take 1 tablet (15 mg total) by mouth daily at bedtime 90 tablet 0     No current facility-administered medications for this visit.        No Known Allergies    Review of Systems   Constitutional:  Negative for activity change and appetite change.   Psychiatric/Behavioral:  Negative for dysphoric mood, sleep disturbance and suicidal ideas. The patient is not nervous/anxious.        Video Exam    There were no vitals filed for this visit.    Physical Exam  Constitutional:       Appearance: Normal appearance. He is normal weight.   Neurological:      Mental Status: He is alert.   Psychiatric:         Attention and Perception: Attention and perception normal.         Mood and Affect: Mood and affect normal.         Speech: Speech normal.         Behavior: Behavior normal. Behavior is  cooperative.         Thought Content: Thought content normal.         Judgment: Judgment normal.          Visit Time  Face to face  Visit Start Time: 6.59 pm   Visit Stop Time: 7.09 pm   Total Visit Duration:  20 minutes total spent in patient care

## 2025-03-05 DIAGNOSIS — Z79.899 ENCOUNTER FOR LONG-TERM (CURRENT) USE OF MEDICATIONS: Primary | ICD-10-CM

## 2025-03-18 ENCOUNTER — TELEPHONE (OUTPATIENT)
Dept: PSYCHIATRY | Facility: CLINIC | Age: 65
End: 2025-03-18

## 2025-03-18 NOTE — TELEPHONE ENCOUNTER
Writer called client's spouse (has GARRETT) and rescheduled 5/15/25 appointment with Dr. Turpin to 5/29/25 (first Thurs late appt available after) due to provider being out of office 5/15/25.

## 2025-05-29 ENCOUNTER — TELEMEDICINE (OUTPATIENT)
Dept: PSYCHIATRY | Facility: CLINIC | Age: 65
End: 2025-05-29
Payer: MEDICARE

## 2025-05-29 DIAGNOSIS — F31.9 BIPOLAR 1 DISORDER (HCC): ICD-10-CM

## 2025-05-29 DIAGNOSIS — F41.1 GENERALIZED ANXIETY DISORDER: ICD-10-CM

## 2025-05-29 PROCEDURE — 99214 OFFICE O/P EST MOD 30 MIN: CPT | Performed by: PSYCHIATRY & NEUROLOGY

## 2025-05-29 RX ORDER — OLANZAPINE 15 MG/1
15 TABLET, FILM COATED ORAL
Qty: 90 TABLET | Refills: 1 | Status: SHIPPED | OUTPATIENT
Start: 2025-05-29

## 2025-05-29 RX ORDER — DIVALPROEX SODIUM 500 MG/1
TABLET, FILM COATED, EXTENDED RELEASE ORAL
Qty: 180 TABLET | Refills: 1 | Status: SHIPPED | OUTPATIENT
Start: 2025-05-29

## 2025-05-29 RX ORDER — LORAZEPAM 1 MG/1
1 TABLET ORAL 2 TIMES DAILY PRN
Qty: 30 TABLET | Refills: 0 | Status: SHIPPED | OUTPATIENT
Start: 2025-05-29

## 2025-05-29 NOTE — PSYCH
MEDICATION MANAGEMENT NOTE    Name: Alonso Shah      : 1960      MRN: [id number]  Encounter Provider: Nini Turpin MD  Encounter Date: 2025   Encounter department: [facility] OUTPATIENT    Insurance: Payor: MEDICARE / Plan: MEDICARE A AND B / Product Type: Medicare A & B Fee for Service /      Reason for Visit:   Chief Complaint   Patient presents with    Medication Management   :  Assessment & Plan  Bipolar 1 disorder (HCC)    Orders:    OLANZapine (ZyPREXA) 15 mg tablet; Take 1 tablet (15 mg total) by mouth daily at bedtime    divalproex sodium (DEPAKOTE ER) 500 mg 24 hr tablet; 2 po daily    Generalized anxiety disorder    Orders:    LORazepam (ATIVAN) 1 mg tablet; Take 1 tablet (1 mg total) by mouth 2 (two) times a day as needed for anxiety        Treatment Recommendations:    Educated about diagnosis and treatment modalities. Verbalizes understanding and agreement with the treatment plan.  Discussed self monitoring of symptoms, and symptom monitoring tools.  Discussed medications and if treatment adjustment was needed or desired.  I am scheduling this patient out for greater than 3 months: Yes - Patient's stability of symptoms warrant this length of time or no significant changes to treatment plan    Medications Risks/Benefits:      Risks, Benefits And Possible Side Effects Of Medications:    Risks, benefits, and possible side effects of medications explained to Alonso and he (or legal representative) verbalizes understanding and agreement for treatment.    Controlled Medication Discussion:     Alonso is using medication appropriately.      History of Present Illness       Pt presents for regular f/u .  Compliant with meds, denies SE  Collateral obtained from pt`s wife, who was present during the interview.  Pt lost 30 lbs on a diet  He was seen by PCP for elbow injury; blood work ordered  Pt continues to take care of hie mother  Mood - reports as good  "and stable; sometimes gets \" annoyed\" by mother, but stays in emotional control. Pt reports good energy and motivation, active, social, does ADLs. Denies manic spx  Anxiety - controlled; denies panic attacks or racing thoughts  Sleep, appetite - good    Review Of Systems: A review of systems is obtained and is negative except for the pertinent positives listed in HPI/Subjective above.      Current Rating Scores:         Areas of Improvement: reviewed in HPI/Subjective Section      Past Medical History[1]  Past Surgical History[2]  Allergies: Allergies[3]    Current Outpatient Medications   Medication Instructions    divalproex sodium (DEPAKOTE ER) 500 mg 24 hr tablet 2 po daily    ezetimibe (ZETIA) 10 mg, Daily    LORazepam (ATIVAN) 1 mg, Oral, 2 times daily PRN    OLANZapine (ZYPREXA) 15 mg, Oral, Daily at bedtime    rosuvastatin (CRESTOR) 5 mg, Every other day        Substance Abuse History:    Tobacco, Alcohol and Drug Use History     Tobacco Use    Smoking status: Not on file    Smokeless tobacco: Not on file   Substance Use Topics    Alcohol use: Not on file    Drug use: Not on file          Social History:    Social History     Socioeconomic History    Marital status: /Civil Union     Spouse name: Not on file    Number of children: Not on file    Years of education: Not on file    Highest education level: Not on file   Occupational History    Not on file   Other Topics Concern    Not on file   Social History Narrative    Not on file        Family Psychiatric History:     Family History[4]    Medical History Reviewed by provider this encounter:  Allergies  Meds  Problems  Med Hx  Surg Hx  Fam Hx          Objective   There were no vitals taken for this visit.     Mental Status Evaluation:    Appearance age appropriate, casually dressed   Behavior cooperative, calm   Speech normal rate, normal volume   Mood euthymic   Affect normal range and intensity, appropriate   Thought Processes organized, goal " directed   Thought Content no overt delusions   Perceptual Disturbances: no auditory hallucinations, no visual hallucinations   Abnormal Thoughts  Risk Potential Suicidal ideation - None  Homicidal ideation - None  Potential for aggression - No   Orientation grossly oriented   Memory recent and remote memory grossly intact   Consciousness alert and awake   Attention Span Concentration Span attention span and concentration are age appropriate   Intellect appears to be of average intelligence   Insight intact   Judgement intact   Muscle Strength and  Gait unable to assess today due to virtual visit   Motor activity unable to assess today due to virtual visit   Language no difficulty naming common objects   Fund of Knowledge adequate knowledge of current events       Laboratory Results: I have personally reviewed all pertinent laboratory/tests results        Suicide/Homicide Risk Assessment:    Risk of Harm to Self:  Based on today's assessment, Alonso presents the following risk of harm to self: none    Risk of Harm to Others:  Based on today's assessment, Alonso presents the following risk of harm to others: none    The following interventions are recommended: Continue medication management.    Psychotherapy Provided:     Individual psychotherapy provided: No    Treatment Plan:    Completed and signed during the session: Yes - Treatment Plan done but not signed at time of office visit due to: Plan reviewed by video and verbal consent given due to virtual visit. Treatment Plan sent to patient via [employer] for signature.    Goals: Progress towards Treatment Plan goals - stable.    Depression Follow-up Plan Completed: Not applicable    Note Share:        Administrative Statements   Administrative Statements   Encounter provider Nini Turpin MD    The Patient is located at Home and in the following state in which I hold an active license [place].    The patient was identified by name and date of birth. Alonso Shah  was informed that this is a telemedicine visit and that the visit is being conducted through the Epic Embedded platform. He agrees to proceed..  My office door was closed. No one else was in the room.  He acknowledged consent and understanding of privacy and security of the video platform. The patient has agreed to participate and understands they can discontinue the visit at any time.    I have spent a total time of 20 minutes in caring for this patient on the day of the visit/encounter including Risks and benefits of tx options, Instructions for management, Documenting in the medical record, and Reviewing/placing orders in the medical record (including tests, medications, and/or procedures), not including the time spent for establishing the audio/video connection.    Visit Time  Face to face  Visit Start Time: 6.30 pm   Visit Stop Time: 6.39 pm   Total Visit Duration: 20 minutes total spent in patient care        Nini Turpin MD 05/29/25       [1]   Past Medical History:  Diagnosis Date    History of hypertension     Hypercholesterolemia    [2] No past surgical history on file.  [3] No Known Allergies  [4] No family history on file.

## 2025-05-29 NOTE — BH TREATMENT PLAN
TREATMENT PLAN (Medication Management Only)        [facility]    Name and Date of Birth:  Alonso Shah 64 y.o. 1960  MRN: [id number]  Date of Treatment Plan: May 29, 2025  Diagnosis/Diagnoses:    1. Bipolar 1 disorder (HCC)    2. Generalized anxiety disorder      Strengths/Personal Resources for Self-Care: supportive family, taking medications as prescribed.  Area/Areas of need (in own words): mood instability  1. Long Term Goal:   maintain control of mood stability.  Target Date:6 months - 11/29/2025  Person/Persons responsible for completion of goal: Alonso  2.  Short Term Objective (s) - How will we reach this goal?:   A.  Provider new recommended medication/dosage changes and/or continue medication(s): continue current medications as prescribed Depakote ER and Zyprexa.  B.  N/A.  C.  N/A.  Target Date:6 months - 11/29/2025  Person/Persons Responsible for Completion of Goal: Alonso  Progress Towards Goals: stable  Treatment Modality: medication management every 6 months  Review due 180 days from date of this plan: 6 months - 11/29/2025  Expected length of service: ongoing treatment unless revised  My Physician/PA/NP and I have developed this plan together and I agree to work on the goals and objectives. I understand the treatment goals that were developed for my treatment.   Electronic Signatures: on file (unless signed below)    Nini Turpin MD 05/29/25

## 2025-05-30 ENCOUNTER — TELEPHONE (OUTPATIENT)
Dept: PSYCHIATRY | Facility: CLINIC | Age: 65
End: 2025-05-30

## 2025-05-30 NOTE — TELEPHONE ENCOUNTER
Writer left voicemail for client's spouse (has GARRETT) to schedule client's 6 month follow up with Dr. Turpin and complete 5/29/25 MSPQ (Medicare Questionnaire).

## 2025-05-30 NOTE — TELEPHONE ENCOUNTER
received a call from pt.'s wife who called back to schedule 6 month follow-up.  did not see GARRETT for his spouse, asked if pt. Was present and she said no. She called pt. To patch him into call, but was not successful. Wife ended call.

## 2025-06-09 ENCOUNTER — TELEPHONE (OUTPATIENT)
Age: 65
End: 2025-06-09